# Patient Record
Sex: FEMALE | URBAN - METROPOLITAN AREA
[De-identification: names, ages, dates, MRNs, and addresses within clinical notes are randomized per-mention and may not be internally consistent; named-entity substitution may affect disease eponyms.]

---

## 2021-08-05 ENCOUNTER — OFFICE VISIT (OUTPATIENT)
Dept: URBAN - METROPOLITAN AREA CLINIC 76 | Facility: CLINIC | Age: 61
End: 2021-08-05
Payer: COMMERCIAL

## 2021-08-05 DIAGNOSIS — H52.4 PRESBYOPIA: ICD-10-CM

## 2021-08-05 DIAGNOSIS — E11.9 TYPE 2 DIABETES MELLITUS W/O COMPLICATION: Primary | ICD-10-CM

## 2021-08-05 DIAGNOSIS — H25.13 NUCLEAR SCLEROSIS CATARACT, BILATERAL: ICD-10-CM

## 2021-08-05 PROCEDURE — 99204 OFFICE O/P NEW MOD 45 MIN: CPT | Performed by: OPTOMETRIST

## 2021-08-05 ASSESSMENT — VISUAL ACUITY
OS: 20/20
OD: 20/20

## 2021-08-05 ASSESSMENT — KERATOMETRY
OS: 44.38
OD: 44.88

## 2021-08-05 ASSESSMENT — INTRAOCULAR PRESSURE
OS: 21
OD: 21

## 2021-08-05 NOTE — IMPRESSION/PLAN
Impression: Type 2 diabetes mellitus w/o complication: T11.4. Bilateral. Plan: DM: No diabetic retinopathy. Discussed ocular and systemic benefits of blood sugar control. Patient was instructed to monitor vision for changes and to call if noted.

## 2023-04-21 NOTE — IMPRESSION/PLAN
Impression: Presbyopia: H52.4. Bilateral. Further working distance. Plan: A glasses prescription has been discussed and generated. Patient to call with any concerns. JEMAL JESUS RN